# Patient Record
Sex: MALE | Race: BLACK OR AFRICAN AMERICAN | NOT HISPANIC OR LATINO | Employment: FULL TIME | URBAN - METROPOLITAN AREA
[De-identification: names, ages, dates, MRNs, and addresses within clinical notes are randomized per-mention and may not be internally consistent; named-entity substitution may affect disease eponyms.]

---

## 2024-05-31 ENCOUNTER — HOSPITAL ENCOUNTER (EMERGENCY)
Facility: HOSPITAL | Age: 24
Discharge: HOME | End: 2024-05-31
Payer: COMMERCIAL

## 2024-05-31 ENCOUNTER — APPOINTMENT (OUTPATIENT)
Dept: RADIOLOGY | Facility: HOSPITAL | Age: 24
End: 2024-05-31
Payer: COMMERCIAL

## 2024-05-31 VITALS
SYSTOLIC BLOOD PRESSURE: 105 MMHG | HEIGHT: 65 IN | DIASTOLIC BLOOD PRESSURE: 55 MMHG | BODY MASS INDEX: 22.82 KG/M2 | OXYGEN SATURATION: 98 % | HEART RATE: 62 BPM | WEIGHT: 137 LBS | RESPIRATION RATE: 18 BRPM | TEMPERATURE: 97.3 F

## 2024-05-31 DIAGNOSIS — S62.317A CLOSED DISPLACED FRACTURE OF BASE OF FIFTH METACARPAL BONE OF LEFT HAND, INITIAL ENCOUNTER: Primary | ICD-10-CM

## 2024-05-31 PROCEDURE — 29125 APPL SHORT ARM SPLINT STATIC: CPT | Mod: LT | Performed by: PHYSICIAN ASSISTANT

## 2024-05-31 PROCEDURE — 73090 X-RAY EXAM OF FOREARM: CPT | Mod: LEFT SIDE | Performed by: RADIOLOGY

## 2024-05-31 PROCEDURE — 73090 X-RAY EXAM OF FOREARM: CPT | Mod: LT

## 2024-05-31 PROCEDURE — 99284 EMERGENCY DEPT VISIT MOD MDM: CPT | Mod: 25

## 2024-05-31 PROCEDURE — 99284 EMERGENCY DEPT VISIT MOD MDM: CPT | Performed by: PHYSICIAN ASSISTANT

## 2024-05-31 PROCEDURE — 73110 X-RAY EXAM OF WRIST: CPT | Mod: LT

## 2024-05-31 PROCEDURE — 73130 X-RAY EXAM OF HAND: CPT | Mod: LT

## 2024-05-31 PROCEDURE — 29125 APPL SHORT ARM SPLINT STATIC: CPT | Performed by: PHYSICIAN ASSISTANT

## 2024-05-31 PROCEDURE — 73110 X-RAY EXAM OF WRIST: CPT | Mod: LEFT SIDE | Performed by: RADIOLOGY

## 2024-05-31 PROCEDURE — 73130 X-RAY EXAM OF HAND: CPT | Mod: LEFT SIDE | Performed by: RADIOLOGY

## 2024-05-31 PROCEDURE — 29125 APPL SHORT ARM SPLINT STATIC: CPT | Mod: LT

## 2024-05-31 PROCEDURE — 2500000001 HC RX 250 WO HCPCS SELF ADMINISTERED DRUGS (ALT 637 FOR MEDICARE OP): Performed by: PHYSICIAN ASSISTANT

## 2024-05-31 RX ORDER — ACETAMINOPHEN 325 MG/1
975 TABLET ORAL ONCE
Status: COMPLETED | OUTPATIENT
Start: 2024-05-31 | End: 2024-05-31

## 2024-05-31 RX ORDER — IBUPROFEN 600 MG/1
600 TABLET ORAL EVERY 6 HOURS PRN
Qty: 30 TABLET | Refills: 0 | Status: SHIPPED | OUTPATIENT
Start: 2024-05-31 | End: 2024-06-10

## 2024-05-31 RX ORDER — IBUPROFEN 600 MG/1
600 TABLET ORAL ONCE
Status: COMPLETED | OUTPATIENT
Start: 2024-05-31 | End: 2024-05-31

## 2024-05-31 RX ORDER — ACETAMINOPHEN 500 MG
500 TABLET ORAL EVERY 6 HOURS PRN
Qty: 30 TABLET | Refills: 0 | Status: SHIPPED | OUTPATIENT
Start: 2024-05-31 | End: 2024-06-10

## 2024-05-31 RX ADMIN — ACETAMINOPHEN 975 MG: 325 TABLET ORAL at 11:00

## 2024-05-31 RX ADMIN — IBUPROFEN 600 MG: 600 TABLET, FILM COATED ORAL at 11:00

## 2024-05-31 ASSESSMENT — LIFESTYLE VARIABLES
EVER FELT BAD OR GUILTY ABOUT YOUR DRINKING: NO
HAVE YOU EVER FELT YOU SHOULD CUT DOWN ON YOUR DRINKING: NO
EVER HAD A DRINK FIRST THING IN THE MORNING TO STEADY YOUR NERVES TO GET RID OF A HANGOVER: NO
TOTAL SCORE: 0
HAVE PEOPLE ANNOYED YOU BY CRITICIZING YOUR DRINKING: NO

## 2024-05-31 ASSESSMENT — PAIN - FUNCTIONAL ASSESSMENT: PAIN_FUNCTIONAL_ASSESSMENT: 0-10

## 2024-05-31 ASSESSMENT — PAIN SCALES - GENERAL: PAINLEVEL_OUTOF10: 7

## 2024-05-31 NOTE — ED TRIAGE NOTES
PT HAVING LEFT HAND PAIN AFTER HITTING A WALL. MEDIAL ASPECT OF HAND I SWOLLEN AND ROM LIMITED BY PAIN.

## 2024-05-31 NOTE — DISCHARGE INSTRUCTIONS
Appointment for June 6th at 9:30AM with hand surgeon, Dr. Hamilton. [facility] [address]. If you could make sure that this patient is able to have someone take him to his appointment in [place] that would be great!

## 2024-05-31 NOTE — ED PROVIDER NOTES
HPI   Chief Complaint   Patient presents with    Hand Injury       Patient is a 24-year-old left-hand-dominant male who presents today for left hand pain after he punched a wall, patient states he was angry because his baby mama was trying to take their kids.  He states he did not injure anything else, states he punched a wall out of frustration.  Did not hit anything else.  Patient has pain mostly along what he points to his ulnar side.  Denies numbness tingling.  He is able to extend all the fingers and make a fist.  No prior injury to this hand in the past.                            John Paul Coma Scale Score: 15                     Patient History   Past Medical History:   Diagnosis Date    Cannabinosis (Multi) 02/06/2020    Cannabinosis    Other specified health status 12/04/2014    No pertinent past medical history    Personal history of other mental and behavioral disorders 10/12/2020    History of depression     No past surgical history on file.  No family history on file.  Social History     Tobacco Use    Smoking status: Not on file    Smokeless tobacco: Not on file   Substance Use Topics    Alcohol use: Not on file    Drug use: Not on file       Physical Exam   ED Triage Vitals [05/31/24 1034]   Temperature Heart Rate Respirations BP   36.3 °C (97.3 °F) 62 18 105/55      Pulse Ox Temp src Heart Rate Source Patient Position   98 % -- -- --      BP Location FiO2 (%)     -- --       Physical Exam  Vitals and nursing note reviewed.   Constitutional:       General: He is not in acute distress.     Appearance: Normal appearance. He is not toxic-appearing.   HENT:      Head: Normocephalic and atraumatic.      Nose: Nose normal.   Eyes:      Extraocular Movements: Extraocular movements intact.   Cardiovascular:      Rate and Rhythm: Normal rate and regular rhythm.   Pulmonary:      Effort: Pulmonary effort is normal.   Abdominal:      Palpations: Abdomen is soft.   Musculoskeletal:         General: Normal range of  motion.        Hands:       Cervical back: Normal range of motion and neck supple.      Comments:   Tenderness palpation along fourth and fifth metacarpal bones, distal cap refill sensation to all 5 digits intact, full range of motion at MCP PIP and DIP to all digits, no rotation noted when patient makes a fist.  2+ radial pulse.  No snuffbox tenderness.  Mild forearm tenderness.     Skin:     General: Skin is warm and dry.   Neurological:      General: No focal deficit present.      Mental Status: He is alert.   Psychiatric:         Mood and Affect: Mood normal.         Thought Content: Thought content normal.       XR hand left 3+ views   Final Result   Metacarpal base fracture.        Signed by: Say Tamez 5/31/2024 12:48 PM   Dictation workstation:   [id number]      XR wrist left 3+ views   Final Result   Metacarpal base fracture.        Signed by: Say Tamez 5/31/2024 12:48 PM   Dictation workstation:   [id number]      XR forearm left 2 views   Final Result   Metacarpal base fracture.        Signed by: Say Tamez 5/31/2024 12:48 PM   Dictation workstation:   [id number]        Labs Reviewed - No data to display      ED Course & MDM   ED Course as of 05/31/24 1643   Fri May 31, 2024   1311 Ulnar gutter splint applied, neurovascularly intact after application [MK]      ED Course User Index  [MK] Za Alvares PA-C         Diagnoses as of 05/31/24 1643   Closed displaced fracture of base of fifth metacarpal bone of left hand, initial encounter       Medical Decision Making    MDM: Patient is a 24-year-old male who presents today for evaluation of a left hand injury after punching a wall, see above HPI and physical exam. X-ray of the left hand showed a metacarpal base fracture, I did speak with joanne Mcelroy who recommended ulnar gutter splint and follow-up with orthopedic surgery.  Patient has a scheduled appointment on the sixth at 930, ulnar gutter splint was applied by myself,  neurovascularly intact following application.  Patient stable for discharge at this time, return precautions to ED were discussed.  Will discharge with Tylenol and ibuprofen prescriptions.        Procedure  Splint Application    Performed by: Za Alvares PA-C  Authorized by: Za Alvares PA-C    Consent:     Consent obtained:  Verbal    Consent given by:  Patient    Risks, benefits, and alternatives were discussed: yes    Universal protocol:     Procedure explained and questions answered to patient or proxy's satisfaction: yes      Imaging studies available: yes      Patient identity confirmed:  Verbally with patient  Pre-procedure details:     Distal neurologic exam:  Normal    Distal perfusion: distal pulses strong and brisk capillary refill    Procedure details:     Location:  Hand    Hand location:  L hand    Strapping: no      Splint type:  Ulnar gutter    Supplies:  Cotton padding, elastic bandage and plaster    Attestation: Splint applied and adjusted personally by me    Post-procedure details:     Distal neurologic exam:  Normal    Distal perfusion: distal pulses strong, brisk capillary refill and unchanged      Procedure completion:  Tolerated well, no immediate complications    Post-procedure imaging: not applicable         Za Alvares PA-C  05/31/24 1649

## 2024-06-03 NOTE — PROGRESS NOTES
[facility]  Hand and Upper Extremity Service  Initial evaluation / Consultation         Consult requested by Referring Physician:    Chief Complaint: Left wrist metacarpal base fracture       24 y.o left hand dominant male who works as a  presenting for left wrist pain following an injury sustained on 5/31. He punched a wall and was seen in the ER with xrays revealing a displaced left 5th metacarpal base fracture. He was placed into a splint and presents now for follow up. He hasn't been able to return to work.            Please refer to New Patient Intake Form scanned into patient's electronic record for self reported past medical history, past surgical history, medications, allergies, family history, social history and 10 point review of systems    Examination:  Constitutional: Oriented to person, place, and time.  Appears well-developed and well-nourished.  Head: Normocephalic and atraumatic.  Eyes: Pupils are equal, round, and reactive to light.  Cardiovascular: Intact distal pulses.  Pulmonary/Chest/Breast: Effort normal. No respiratory distress.  Neurological: Alert and oriented to person, place, and time.  Skin: Skin is warm and dry.  Psychiatric: normal mood and affect.  Behavior is normal.  Musculoskeletal: Left wrist reveals ulnar sided swelling. Tenderness to palpation at the base of 5th metacarpal. Able to demonstrate near full range of motion but apprehensive to do so.        Personal Interpretation of Diagnostic studies: Xrays of left wrist dated 5/31 reveal a juxta-articular fracture of 5th metacarpal base with ulnar and proximal displacement of the distal segment by the ECU pull.       Impression: 5th metacarpal base fracture       Plan: We've discussed treatment options and given the displacement and potential of further displacement by pull of the extensor carpi ulnaris, he's agreed to proceed with closed reduction and percutaneous pinning of this fracture.  Today, we'll place him into a removable brace that he can remove for hygiene purposes. We'll get him on the operating room schedule for next week for this procedure. He'll likely then be in a cast for 5 weeks or so after surgery, allowing the fracture to heal before pin removal and progressive use of the hand and it may be 8-10 weeks before he's able to return to work as a .     Follow up: Next week for surgery        Patient was prescribed a contender ulnar gutter orthosis for fifth metacarpal fracture. The patient has weakness, instability and/or deformity of their left hand which requires stabilization from this orthosis to improve their function.      Verbal and written instructions for the use, wear schedule, cleaning and application of this item were given.  Patient was instructed that should the brace result in increased pain, decreased sensation, increased swelling, or an overall worsening of their medical condition, to please contact our office immediately.     Orthotic management and training was provided for skin care, modifications due to healing tissues, edema changes, interruption in skin integrity, and safety precautions with the orthosis.           Emil Hamilton MD  [facility]  Department of Orthopaedic Surgery  Hand and Upper Extremity Reconstruction      Scribe Attestation  By signing my name below, I, Tico Loaiza , Scribmervin   attest that this documentation has been prepared under the direction and in the presence of Dr. Emil Hamilton.      Dictation performed with the use of voice recognition software.  Syntax and grammatical errors may exist.

## 2024-06-03 NOTE — H&P (VIEW-ONLY)
[facility]  Hand and Upper Extremity Service  Initial evaluation / Consultation         Consult requested by Referring Physician:    Chief Complaint: Left wrist metacarpal base fracture       24 y.o left hand dominant male who works as a  presenting for left wrist pain following an injury sustained on 5/31. He punched a wall and was seen in the ER with xrays revealing a displaced left 5th metacarpal base fracture. He was placed into a splint and presents now for follow up. He hasn't been able to return to work.            Please refer to New Patient Intake Form scanned into patient's electronic record for self reported past medical history, past surgical history, medications, allergies, family history, social history and 10 point review of systems    Examination:  Constitutional: Oriented to person, place, and time.  Appears well-developed and well-nourished.  Head: Normocephalic and atraumatic.  Eyes: Pupils are equal, round, and reactive to light.  Cardiovascular: Intact distal pulses.  Pulmonary/Chest/Breast: Effort normal. No respiratory distress.  Neurological: Alert and oriented to person, place, and time.  Skin: Skin is warm and dry.  Psychiatric: normal mood and affect.  Behavior is normal.  Musculoskeletal: Left wrist reveals ulnar sided swelling. Tenderness to palpation at the base of 5th metacarpal. Able to demonstrate near full range of motion but apprehensive to do so.        Personal Interpretation of Diagnostic studies: Xrays of left wrist dated 5/31 reveal a juxta-articular fracture of 5th metacarpal base with ulnar and proximal displacement of the distal segment by the ECU pull.       Impression: 5th metacarpal base fracture       Plan: We've discussed treatment options and given the displacement and potential of further displacement by pull of the extensor carpi ulnaris, he's agreed to proceed with closed reduction and percutaneous pinning of this fracture.  Today, we'll place him into a removable brace that he can remove for hygiene purposes. We'll get him on the operating room schedule for next week for this procedure. He'll likely then be in a cast for 5 weeks or so after surgery, allowing the fracture to heal before pin removal and progressive use of the hand and it may be 8-10 weeks before he's able to return to work as a .     Follow up: Next week for surgery        Patient was prescribed a contender ulnar gutter orthosis for fifth metacarpal fracture. The patient has weakness, instability and/or deformity of their left hand which requires stabilization from this orthosis to improve their function.      Verbal and written instructions for the use, wear schedule, cleaning and application of this item were given.  Patient was instructed that should the brace result in increased pain, decreased sensation, increased swelling, or an overall worsening of their medical condition, to please contact our office immediately.     Orthotic management and training was provided for skin care, modifications due to healing tissues, edema changes, interruption in skin integrity, and safety precautions with the orthosis.           Emil Hamilton MD  [facility]  Department of Orthopaedic Surgery  Hand and Upper Extremity Reconstruction      Scribe Attestation  By signing my name below, I, Tico Loaiza , Scribmervin   attest that this documentation has been prepared under the direction and in the presence of Dr. Emil Hamilton.      Dictation performed with the use of voice recognition software.  Syntax and grammatical errors may exist.

## 2024-06-06 ENCOUNTER — OFFICE VISIT (OUTPATIENT)
Dept: ORTHOPEDIC SURGERY | Facility: CLINIC | Age: 24
End: 2024-06-06
Payer: COMMERCIAL

## 2024-06-06 VITALS — WEIGHT: 137 LBS | HEIGHT: 65 IN | BODY MASS INDEX: 22.82 KG/M2

## 2024-06-06 DIAGNOSIS — S62.317A CLOSED DISPLACED FRACTURE OF BASE OF FIFTH METACARPAL BONE OF LEFT HAND, INITIAL ENCOUNTER: ICD-10-CM

## 2024-06-06 DIAGNOSIS — S62.317A CLOSED DISPLACED FRACTURE OF BASE OF FIFTH METACARPAL BONE OF LEFT HAND, INITIAL ENCOUNTER: Primary | ICD-10-CM

## 2024-06-06 PROCEDURE — 99203 OFFICE O/P NEW LOW 30 MIN: CPT | Performed by: ORTHOPAEDIC SURGERY

## 2024-06-06 PROCEDURE — 99213 OFFICE O/P EST LOW 20 MIN: CPT | Performed by: ORTHOPAEDIC SURGERY

## 2024-06-06 ASSESSMENT — PAIN - FUNCTIONAL ASSESSMENT: PAIN_FUNCTIONAL_ASSESSMENT: 0-10

## 2024-06-06 ASSESSMENT — PAIN SCALES - GENERAL: PAINLEVEL_OUTOF10: 9

## 2024-06-06 ASSESSMENT — PAIN DESCRIPTION - DESCRIPTORS: DESCRIPTORS: ACHING;SORE

## 2024-06-10 ENCOUNTER — PRE-ADMISSION TESTING (OUTPATIENT)
Dept: PREADMISSION TESTING | Facility: HOSPITAL | Age: 24
End: 2024-06-10
Payer: COMMERCIAL

## 2024-06-10 ENCOUNTER — LAB (OUTPATIENT)
Dept: LAB | Facility: LAB | Age: 24
End: 2024-06-10
Payer: COMMERCIAL

## 2024-06-10 VITALS
WEIGHT: 124.78 LBS | OXYGEN SATURATION: 100 % | HEIGHT: 64 IN | BODY MASS INDEX: 21.3 KG/M2 | RESPIRATION RATE: 16 BRPM | HEART RATE: 69 BPM | DIASTOLIC BLOOD PRESSURE: 54 MMHG | TEMPERATURE: 97.2 F | SYSTOLIC BLOOD PRESSURE: 109 MMHG

## 2024-06-10 DIAGNOSIS — Z01.818 PREOP TESTING: ICD-10-CM

## 2024-06-10 DIAGNOSIS — Z01.818 PREOP TESTING: Primary | ICD-10-CM

## 2024-06-10 LAB
ANION GAP SERPL CALC-SCNC: 10 MMOL/L (ref 10–20)
BUN SERPL-MCNC: 15 MG/DL (ref 6–23)
CALCIUM SERPL-MCNC: 9.5 MG/DL (ref 8.6–10.3)
CHLORIDE SERPL-SCNC: 103 MMOL/L (ref 98–107)
CO2 SERPL-SCNC: 31 MMOL/L (ref 21–32)
CREAT SERPL-MCNC: 0.98 MG/DL (ref 0.5–1.3)
EGFRCR SERPLBLD CKD-EPI 2021: >90 ML/MIN/1.73M*2
ERYTHROCYTE [DISTWIDTH] IN BLOOD BY AUTOMATED COUNT: 14.2 % (ref 11.5–14.5)
GLUCOSE SERPL-MCNC: 80 MG/DL (ref 74–99)
HCT VFR BLD AUTO: 41.3 % (ref 41–52)
HGB BLD-MCNC: 13.2 G/DL (ref 13.5–17.5)
MCH RBC QN AUTO: 26.1 PG (ref 26–34)
MCHC RBC AUTO-ENTMCNC: 32 G/DL (ref 32–36)
MCV RBC AUTO: 82 FL (ref 80–100)
NRBC BLD-RTO: ABNORMAL /100{WBCS}
PLATELET # BLD AUTO: 146 X10*3/UL (ref 150–450)
POTASSIUM SERPL-SCNC: 3.8 MMOL/L (ref 3.5–5.3)
RBC # BLD AUTO: 5.05 X10*6/UL (ref 4.5–5.9)
SODIUM SERPL-SCNC: 140 MMOL/L (ref 136–145)
WBC # BLD AUTO: 4.4 X10*3/UL (ref 4.4–11.3)

## 2024-06-10 PROCEDURE — 80048 BASIC METABOLIC PNL TOTAL CA: CPT

## 2024-06-10 PROCEDURE — 85027 COMPLETE CBC AUTOMATED: CPT

## 2024-06-10 PROCEDURE — 36415 COLL VENOUS BLD VENIPUNCTURE: CPT

## 2024-06-10 ASSESSMENT — DUKE ACTIVITY SCORE INDEX (DASI)
CAN YOU PARTICIPATE IN MODERATE RECREATIONAL ACTIVITIES LIKE GOLF, BOWLING, DANCING, DOUBLES TENNIS OR THROWING A BASEBALL OR FOOTBALL: YES
DASI METS SCORE: 9.9
CAN YOU DO HEAVY WORK AROUND THE HOUSE LIKE SCRUBBING FLOORS OR LIFTING AND MOVING HEAVY FURNITURE: YES
CAN YOU RUN A SHORT DISTANCE: YES
CAN YOU TAKE CARE OF YOURSELF (EAT, DRESS, BATHE, OR USE TOILET): YES
CAN YOU DO LIGHT WORK AROUND THE HOUSE LIKE DUSTING OR WASHING DISHES: YES
CAN YOU WALK A BLOCK OR TWO ON LEVEL GROUND: YES
CAN YOU DO MODERATE WORK AROUND THE HOUSE LIKE VACUUMING, SWEEPING FLOORS OR CARRYING GROCERIES: YES
TOTAL_SCORE: 58.2
CAN YOU DO YARD WORK LIKE RAKING LEAVES, WEEDING OR PUSHING A MOWER: YES
CAN YOU HAVE SEXUAL RELATIONS: YES
CAN YOU CLIMB A FLIGHT OF STAIRS OR WALK UP A HILL: YES
CAN YOU WALK INDOORS, SUCH AS AROUND YOUR HOUSE: YES
CAN YOU PARTICIPATE IN STRENOUS SPORTS LIKE SWIMMING, SINGLES TENNIS, FOOTBALL, BASKETBALL, OR SKIING: YES

## 2024-06-10 ASSESSMENT — PAIN SCALES - GENERAL: PAINLEVEL_OUTOF10: 10 - WORST POSSIBLE PAIN

## 2024-06-10 ASSESSMENT — PAIN DESCRIPTION - DESCRIPTORS: DESCRIPTORS: ACHING

## 2024-06-10 ASSESSMENT — PAIN - FUNCTIONAL ASSESSMENT: PAIN_FUNCTIONAL_ASSESSMENT: 0-10

## 2024-06-10 NOTE — CPM/PAT H&P
CPM/PAT Evaluation       Name: Deborah Mcgee (Astoria D Everardo)  /Age:  y.o.     In-Person       Chief Complaint: Closed displaced fracture of base of fifth metacarpal bone of left hand, initial encounter     HPI  Patient is an alert and oriented 24 year old male scheduled for a closed reduction and percutaneous pinning left 5th metacarpal fracture on 2024 with Dr. Hamilton for closed displaced fracture of base of fifth metacarpal bone of left hand, initial encounter. Presents to [facility] PAT today for perioperative risk stratification and optimization.     Past Medical History:   Diagnosis Date    Cannabinosis (Multi) 2020    Cannabinosis    Other specified health status 2014    No pertinent past medical history    Personal history of other mental and behavioral disorders 10/12/2020    History of depression     No past surgical history on file.    No Known Allergies    Prior to Admission medications    Medication Sig Start Date End Date Taking? Authorizing Provider   acetaminophen (Tylenol) 500 mg tablet Take 1 tablet (500 mg) by mouth every 6 hours if needed for mild pain (1 - 3) for up to 10 days. 5/31/24 6/10/24  Za Alvares PA-C   ibuprofen 600 mg tablet Take 1 tablet (600 mg) by mouth every 6 hours if needed for mild pain (1 - 3) for up to 10 days. 5/31/24 6/10/24  Za Alvares PA-C       Review of Systems   Constitutional: Negative for chills, decreased appetite, diaphoresis, fever and malaise/fatigue.   Eyes:  Negative for blurred vision and double vision.   Cardiovascular:  Negative for chest pain, claudication, cyanosis, dyspnea on exertion, irregular heartbeat, leg swelling, near-syncope and palpitations.   Respiratory:  Negative for cough, hemoptysis, shortness of breath and wheezing.    Endocrine: Negative for cold intolerance, heat intolerance, polydipsia, polyphagia and polyuria.   Gastrointestinal:  Negative for abdominal pain, constipation, diarrhea, dysphagia, nausea  "and vomiting.   Genitourinary:  Negative for bladder incontinence, dysuria, hematuria, incomplete emptying, nocturia, pelvic pain and urgency.   Neurological:  Negative for headaches, light-headedness, paresthesias, sensory change and weakness.   Psychiatric/Behavioral:  Negative for altered mental status.       Vitals and nursing note reviewed.     Physical exam  Constitutional:       Appearance: Normal appearance. He is normal weight.   HENT:      Head: Normocephalic and atraumatic.      Mouth/Throat:      Mouth: Mucous membranes are moist.      Pharynx: Oropharynx is clear.   Eyes:      Extraocular Movements: Extraocular movements intact.      Conjunctiva/sclera: Conjunctivae normal.      Pupils: Pupils are equal, round, and reactive to light.   Cardiovascular:      Rate and Rhythm: Normal rate and regular rhythm.      Pulses: Normal pulses.      Heart sounds: Normal heart sounds.      No audible carotid bruit  Pulmonary:      Effort: Pulmonary effort is normal.      Breath sounds: Normal breath sounds.   Abdominal:      General: Abdomen is flat. Bowel sounds are normal.      Palpations: Abdomen is soft.   Musculoskeletal:      Cervical back: Normal range of motion and neck supple.   Skin:     General: Skin is warm and dry.      Capillary Refill: Capillary refill takes less than 2 seconds.   Neurological:      General: No focal deficit present.      Mental Status: He is alert and oriented to person, place, and time. Mental status is at baseline.   Psychiatric:         Mood and Affect: Mood normal.         Behavior: Behavior normal.         Thought Content: Thought content normal.         Judgment: Judgment normal.     Vitals and nursing note reviewed. Physical exam within normal limits.     Visit Vitals  /54   Pulse 69   Temp 36.2 °C (97.2 °F) (Temporal)   Resp 16   Ht 1.626 m (5' 4\")   Wt 56.6 kg (124 lb 12.5 oz)   SpO2 100%   BMI 21.42 kg/m²   Smoking Status Every Day   BSA 1.6 m²      DASI Risk Score  "     Flowsheet Row Most Recent Value   DASI SCORE 58.2   METS Score (Will be calculated only when all the questions are answered) 9.9          Caprini DVT Assessment      Flowsheet Row Most Recent Value   DVT Score 2   Current Status Minor surgery planned   Age Less than 40 years   BMI 30 or less          Modified Frailty Index      Flowsheet Row Most Recent Value   Modified Frailty Index Calculator 0          CHADS2 Stroke Risk         N/A 3 to 100%: High Risk   2 to < 3%: Medium Risk   0 to < 2%: Low Risk     Last Change: N/A          This score determines the patient's risk of having a stroke if the patient has atrial fibrillation.        This score is not applicable to this patient. Components are not calculated.          Revised Cardiac Risk Index      Flowsheet Row Most Recent Value   Revised Cardiac Risk Calculator 0          Apfel Simplified Score    No data to display       Risk Analysis Index Results This Encounter    No data found in the last 1 encounters.       Stop Bang Score      Flowsheet Row Most Recent Value   Do you snore loudly? 0   Do you often feel tired or fatigued after your sleep? 0   Has anyone ever observed you stop breathing in your sleep? 0   Do you have or are you being treated for high blood pressure? 0   Recent BMI (Calculated) 21.4   Is BMI greater than 35 kg/m2? 0=No   Age older than 50 years old? 0=No   Is your neck circumference greater than 17 inches (Male) or 16 inches (Female)? 0   Gender - Male 1=Yes   STOP-BANG Total Score 1          Assessment & Plan:    Neuro:  No diagnosis or significant findings on chart review or clinical presentation and evaluation.     HEENT/Airway:  No diagnosis or significant findings on chart review or clinical presentation and evaluation.   STOP-BANG Score-1 point low risk for FIFI    Mallampati::  II    TM distance::  >3 FB    Neck ROM::  Full  No dentures, caps, or dental implants    Cardiovascular:  No diagnosis or significant findings on chart  review or clinical presentation and evaluation.   METS: 9.9  RCRI: 0 points, 3.9%  risk for postoperative MACE   NAVNEET: 0.1% risk for postoperative MACE    Pulmonary:  No diagnosis or significant findings on chart review or clinical presentation and evaluation.   ARISCAT: <26 points, 1.6% risk of in-hospital postoperative pulmonary complication  PRODIGY: Moderate risk for opioid induced respiratory depression  Smoking History-Smokes cigars 1-2/day x 11 years  Discussed smoking cessation and deep breathing handout given    Renal:  No diagnosis or significant findings on chart review or clinical presentation and evaluation, however, the patient is at increased risk of perioperative renal complications secondary to male sex. Preventative measures include BP monitoring, medication compliance, and hydration management.   BMP reviewed    Endocrine:  No diagnosis or significant findings on chart review or clinical presentation and evaluation.     Hematologic:  Hgb 13.2  CBC reviewed  Caprini Score 2, patient at Low risk for postoperative DVT. Pt supplied education/VTE handout    Gastrointestinal:   No diagnosis or significant findings on chart review or clinical presentation and evaluation.   Alcohol use-Occasional  Drug use-denies    Infectious disease:   No diagnosis or significant findings on chart review or clinical presentation and evaluation.     Musculoskeletal:   No diagnosis or significant findings on chart review or clinical presentation and evaluation.   JHFRAT score-0 points low risk for falls    Anesthesia:  No anesthesia complications  No family history of anesthesia complications    Labs & Imaging ordered:  CBC, BMP  Nickel/metal allergy-denies  Shellfish allergy-denies    Overall, patient at low risk for the scheduled surgery. Discussed with patient medication instructions, NPO guidelines, and any questions or concerns.    Face to face time-30 minutes

## 2024-06-10 NOTE — PREPROCEDURE INSTRUCTIONS
Medication List            Accurate as of Smitha 10, 2024  2:58 PM. Always use your most recent med list.                acetaminophen 500 mg tablet  Commonly known as: Tylenol  Take 1 tablet (500 mg) by mouth every 6 hours if needed for mild pain (1 - 3) for up to 10 days.  Medication Adjustments for Surgery: Take morning of surgery with sip of water, no other fluids     ibuprofen 600 mg tablet  Take 1 tablet (600 mg) by mouth every 6 hours if needed for mild pain (1 - 3) for up to 10 days.  Medication Adjustments for Surgery: Stop 7 days before surgery  Notes to patient: Last dose preoperatively 6/4/2024            NPO Instructions:     Do not eat any food after midnight the night before your surgery/procedure.  You may have clear liquids until TWO hours before surgery/procedure. This includes water, black tea/coffee, (no milk or cream) apple juice and electrolyte drinks (Gatorade).  You may chew gum up to TWO hours before your surgery/procedure.     Additional Instructions:      Seven/Six Days before Surgery:  Review your medication instructions, stop indicated medications  Five Days before Surgery:  Review your medication instructions, stop indicated medications  Three Days before Surgery:  Review your medication instructions, stop indicated medications  The Day before Surgery:  Review your medication instructions, stop indicated medications  You will be contacted regarding the time of your arrival to facility and surgery time  Do not eat any food after Midnight  Day of Surgery:  Review your medication instructions, take indicated medications  If you have diabetes, please check your fasting blood sugar upon awakening.  If fasting blood sugar is <80 mg/dl, drink 100 ml of apple juice, time limit of 2 hours before  You may have clear liquids until TWO hours before surgery/procedure.  This includes water, black tea/coffee, (no milk or cream) apple juice and electrolyte drinks (Gatorade)  You may chew gum up to TWO  hours before your surgery/procedure  Wear  comfortable loose fitting clothing  Do not use moisturizers, creams, lotions or perfume  All jewelry and valuables should be left at home     CONTACT SURGEON'S OFFICE IF YOU DEVELOP:  * Fever = 100.4 F   * New respiratory symptoms (e.g. cough, shortness of breath, respiratory distress, sore throat)  * Recent loss of taste or smell  *Flu like symptoms such as headache, fatigue or gastrointestinal symptoms  * You develop any open sores, shingles, burning or painful urination   AND/OR:  * You no longer wish to have the surgery.  * Any other personal circumstances change that may lead to the need to cancel or defer this surgery.  *You were admitted to any hospital within one week of your planned procedure.     SMOKING:  *Quitting smoking can make a huge difference to your health and recovery from surgery.    *If you need help with quitting, call 9-752-QUIT-NOW.     THE DAY BEFORE SURGERY:  *Do not eat any food after midnight the night before your surgery.   *You may have up to TEN OUNCES of clear liquids until TWO hours before your instructed ARRIVAL TIME to hospital. This includes water, black tea/coffee, (no milk or cream) apple juice, clear broth and electrolyte drinks (Gatorade). Please avoid clear liquids that are red in color.   *You may chew gum/mints up to TWO hours before your surgery/procedure.     SURGICAL TIME:  *You will be contacted between 2 p.m. and 3 p.m. the business day before your surgery with your arrival time.  *If you haven't received a call by 3pm, call [phone]  *Scheduled surgery times may change and you will be notified if this occurs-check your personal voicemail for any updates.     ON THE MORNING OF SURGERY:  *Wear comfortable, loose fitting clothing.   *Do not use moisturizers, creams, lotions or perfume.  *All jewelry and valuables should be left at home.  *Prosthetic devices such as contact lenses, hearing aids, dentures, eyelash  extensions, hairpins and body piercing must be removed before surgery.     BRING WITH YOU:  *Photo ID and insurance card  *Current list of medications and allergies  *Pacemaker/Defibrillator/Heart stent cards  *CPAP machine and mask  *Slings/splints/crutches  *Copy of your complete Advanced Directive/DHPOA-if applicable  *Neurostimulator implant remote     PARKING AND ARRIVAL:  *Check in at the Main Entrance desk and let them know you are here for surgery.     IF YOU ARE HAVING OUTPATIENT/SAME DAY SURGERY:  *A responsible adult MUST accompany you at the time of discharge and stay with you for 24 hours after your surgery.  *You may NOT drive yourself home after surgery.  *You may use a taxi or ride sharing service ([employer], Uber) to return home ONLY if you are accompanied by a friend or family member.  *Instructions for resuming your medications will be provided by your surgeon.     Thank you for coming to Pre Admission testing.      If I have prescribe medication please don't forget to  at your pharmacy.      Any questions about today's visit call [phone] and leave a message in the general mailbox.     Patient instructed to ambulate as soon as possible postoperatively to decrease thromboembolic risk.     Tomasz Long, APRN-CNP     Thank you for visiting [facility].  If you have any changes to your health condition, please call the surgeons office to alert them and give them details of your symptoms.        Preoperative Fasting Guidelines     Why must I stop eating and drinking near surgery time?  With sedation, food or liquid in your stomach can enter your lungs causing serious complications  Increases nausea and vomiting     When do I need to stop eating and drinking before my surgery?  Do not eat any food after midnight the night before your surgery/procedure.  You may have up to TEN ounces of clear liquid until TWO hours before your instructed arrival time to the hospital.   This includes water, black tea/coffee, (no milk or cream) apple juice, and electrolyte drinks (Gatorade)  You may chew gum until TWO hours before your surgery/procedure        Additional Instructions:      The Day before Surgery:  -Review your medication instructions, stop indicated medications  -You will be contacted in the evening regarding the time of your arrival to facility and surgery time     Day of Surgery:  -Review your medication instructions, take indicated medications  -Wear comfortable loose fitting clothing  -Do not use moisturizers, creams, lotions or perfume  -All jewelry and valuables should be left at home                   Preoperative Brain Exercises     What are brain exercises?  A brain exercise is any activity that engages your thinking (cognitive) skills.     What types of activities are considered brain exercises?  Jigsaw puzzles, crossword puzzles, word jumble, memory games, word search, and many more.  Many can be found free online or on your phone via a mobile ashish.     Why should I do brain exercises before my surgery?  More recent research has shown brain exercise before surgery can lower the risk of postoperative delirium (confusion) which can be especially important for older adults.  Patients who did brain exercises for 5 to 10 hours the days before surgery, cut their risk of postoperative delirium in half up to 1 week after surgery.                         [facility]     Preoperative Deep Breathing Exercises     Why it is important to do deep breathing exercises before my surgery?  Deep breathing exercises strengthen your breathing muscles.  This helps you to recover after your surgery and decreases the chance of breathing complications.        How are the deep breathing exercises done?  Sit straight with your back supported.  Breathe in deeply and slowly through your nose. Your lower rib cage should expand and your abdomen may move forward.  Hold that  breath for 3 to 5 seconds.  Breathe out through pursed lips, slowly and completely.  Rest and repeat 10 times every hour while awake.  Rest longer if you become dizzy or lightheaded.                      [facility]     Preoperative Deep Breathing Exercises     Why it is important to do deep breathing exercises before my surgery?  Deep breathing exercises strengthen your breathing muscles.  This helps you to recover after your surgery and decreases the chance of breathing complications.        How are the deep breathing exercises done?  Sit straight with your back supported.  Breathe in deeply and slowly through your nose. Your lower rib cage should expand and your abdomen may move forward.  Hold that breath for 3 to 5 seconds.  Breathe out through pursed lips, slowly and completely.  Rest and repeat 10 times every hour while awake.  Rest longer if you become dizzy or lightheaded.        Patient Information: Incentive Spirometer  What is an incentive spirometer?  An incentive spirometer is a device used before and after surgery to “exercise” your lungs.  It helps you to take deeper breaths to expand your lungs.  Below is an example of a basic incentive spirometer.  The device you receive may differ slightly but they all function the same.    Why do I need to use an incentive spirometer?  Using your incentive spirometer prepares your lungs for surgery and helps prevent lung problems after surgery.  How do I use my incentive spirometer?  When you're using your incentive spirometer, make sure to breathe through your mouth. If you breathe through your nose, the incentive spirometer won't work properly. You can hold your nose if you have trouble.  If you feel dizzy at any time, stop and rest. Try again at a later time.  Follow the steps below:  Set up your incentive spirometer, expand the flexible tubing and connect to the outlet.  Sit upright in a chair or bed. Hold the incentive spirometer at  eye level.   Put the mouthpiece in your mouth and close your lips tightly around it. Slowly breathe out (exhale) completely.  Breathe in (inhale) slowly through your mouth as deeply as you can. As you take a breath, you will see the piston rise inside the large column. While the piston rises, the indicator should move upwards. It should stay in between the 2 arrows (see Figure).  Try to get the piston as high as you can, while keeping the indicator between the arrows.   If the indicator doesn't stay between the arrows, you're breathing either too fast or too slow.  When you get it as high as you can, hold your breath for 10 seconds, or as long as possible. While you're holding your breath, the piston will slowly fall to the base of the spirometer.  Once the piston reaches the bottom of the spirometer, breathe out slowly through your mouth. Rest for a few seconds.  Repeat 10 times. Try to get the piston to the same level with each breath.  Repeat every hour while awake  You can carefully clean the outside of the mouthpiece with an alcohol wipe or soap and water.       Patient and Family Education             Ways You Can Help Prevent Blood Clots                    This handout explains some simple things you can do to help prevent blood clots.      Blood clots are blockages that can form in the body's veins. When a blood clot forms in your deep veins, it may be called a deep vein thrombosis, or DVT for short. Blood clots can happen in any part of the body where blood flows, but they are most common in the arms and legs. If a piece of a blood clot breaks free and travels to the lungs, it is called a pulmonary embolus (PE). A PE can be a very serious problem.         Being in the hospital or having surgery can raise your chances of getting a blood clot because you may not be well enough to move around as much as you normally do.         Ways you can help prevent blood clots in the hospital           Wearing SCDs. SCDs  stands for Sequential Compression Devices.   SCDs are special sleeves that wrap around your legs  They attach to a pump that fills them with air to gently squeeze your legs every few minutes.   This helps return the blood in your legs to your heart.   SCDs should only be taken off when walking or bathing.   SCDs may not be comfortable, but they can help save your life.                                            Wearing compression stockings - if your doctor orders them. These special snug fitting stockings gently squeeze your legs to help blood flow.       Walking. Walking helps move the blood in your legs.   If your doctor says it is ok, try walking the halls at least   5 times a day. Ask us to help you get up, so you don't fall.      Taking any blood thinning medicines your doctor orders.        Page 1 of 2            [facility]; 3/23   Ways you can help prevent blood clots at home         Wearing compression stockings - if your doctor orders them. ? Walking - to help move the blood in your legs.       Taking any blood thinning medicines your doctor orders.      Signs of a blood clot or PE        Tell your doctor or nurse know right away if you have of the problems listed below.    If you are at home, seek medical care right away. Call 911 for chest pain or problems breathing.                Signs of a blood clot (DVT) - such as pain,  swelling, redness or warmth in your arm or leg      Signs of a pulmonary embolism (PE) - such as chest     pain or feeling short of breath

## 2024-06-10 NOTE — PERIOPERATIVE NURSING NOTE
Called patient at [phone] as he has not yet arrived for his PAT visit which was rescheduled after he didn't answer/ was not available for the phone screening call this morning. Pt reports he just pulled in the parking lot and will be up to the second floor in a few minutes to check in.

## 2024-06-12 ENCOUNTER — ANESTHESIA EVENT (OUTPATIENT)
Dept: OPERATING ROOM | Facility: HOSPITAL | Age: 24
End: 2024-06-12
Payer: COMMERCIAL

## 2024-06-12 ENCOUNTER — APPOINTMENT (OUTPATIENT)
Dept: RADIOLOGY | Facility: HOSPITAL | Age: 24
End: 2024-06-12
Payer: COMMERCIAL

## 2024-06-12 ENCOUNTER — HOSPITAL ENCOUNTER (OUTPATIENT)
Facility: HOSPITAL | Age: 24
Setting detail: OUTPATIENT SURGERY
Discharge: HOME | End: 2024-06-12
Attending: ORTHOPAEDIC SURGERY | Admitting: ORTHOPAEDIC SURGERY
Payer: COMMERCIAL

## 2024-06-12 ENCOUNTER — ANESTHESIA (OUTPATIENT)
Dept: OPERATING ROOM | Facility: HOSPITAL | Age: 24
End: 2024-06-12
Payer: COMMERCIAL

## 2024-06-12 VITALS
OXYGEN SATURATION: 97 % | RESPIRATION RATE: 16 BRPM | TEMPERATURE: 98.1 F | BODY MASS INDEX: 21.16 KG/M2 | DIASTOLIC BLOOD PRESSURE: 62 MMHG | HEIGHT: 65 IN | SYSTOLIC BLOOD PRESSURE: 110 MMHG | HEART RATE: 62 BPM | WEIGHT: 126.98 LBS

## 2024-06-12 DIAGNOSIS — S62.317A CLOSED DISPLACED FRACTURE OF BASE OF FIFTH METACARPAL BONE OF LEFT HAND, INITIAL ENCOUNTER: Primary | ICD-10-CM

## 2024-06-12 PROCEDURE — 2500000004 HC RX 250 GENERAL PHARMACY W/ HCPCS (ALT 636 FOR OP/ED): Performed by: ANESTHESIOLOGY

## 2024-06-12 PROCEDURE — 2500000004 HC RX 250 GENERAL PHARMACY W/ HCPCS (ALT 636 FOR OP/ED): Performed by: ORTHOPAEDIC SURGERY

## 2024-06-12 PROCEDURE — 7100000002 HC RECOVERY ROOM TIME - EACH INCREMENTAL 1 MINUTE: Performed by: ORTHOPAEDIC SURGERY

## 2024-06-12 PROCEDURE — 2500000004 HC RX 250 GENERAL PHARMACY W/ HCPCS (ALT 636 FOR OP/ED): Mod: JZ | Performed by: ORTHOPAEDIC SURGERY

## 2024-06-12 PROCEDURE — 7100000009 HC PHASE TWO TIME - INITIAL BASE CHARGE: Performed by: ORTHOPAEDIC SURGERY

## 2024-06-12 PROCEDURE — 26608 TREAT METACARPAL FRACTURE: CPT | Performed by: ORTHOPAEDIC SURGERY

## 2024-06-12 PROCEDURE — 3700000001 HC GENERAL ANESTHESIA TIME - INITIAL BASE CHARGE: Performed by: ORTHOPAEDIC SURGERY

## 2024-06-12 PROCEDURE — 7100000010 HC PHASE TWO TIME - EACH INCREMENTAL 1 MINUTE: Performed by: ORTHOPAEDIC SURGERY

## 2024-06-12 PROCEDURE — 2500000005 HC RX 250 GENERAL PHARMACY W/O HCPCS: Performed by: ORTHOPAEDIC SURGERY

## 2024-06-12 PROCEDURE — 3600000008 HC OR TIME - EACH INCREMENTAL 1 MINUTE - PROCEDURE LEVEL THREE: Performed by: ORTHOPAEDIC SURGERY

## 2024-06-12 PROCEDURE — 7100000001 HC RECOVERY ROOM TIME - INITIAL BASE CHARGE: Performed by: ORTHOPAEDIC SURGERY

## 2024-06-12 PROCEDURE — 3700000002 HC GENERAL ANESTHESIA TIME - EACH INCREMENTAL 1 MINUTE: Performed by: ORTHOPAEDIC SURGERY

## 2024-06-12 PROCEDURE — 3600000003 HC OR TIME - INITIAL BASE CHARGE - PROCEDURE LEVEL THREE: Performed by: ORTHOPAEDIC SURGERY

## 2024-06-12 RX ORDER — FENTANYL CITRATE 50 UG/ML
INJECTION, SOLUTION INTRAMUSCULAR; INTRAVENOUS AS NEEDED
Status: DISCONTINUED | OUTPATIENT
Start: 2024-06-12 | End: 2024-06-12

## 2024-06-12 RX ORDER — SODIUM CHLORIDE, SODIUM LACTATE, POTASSIUM CHLORIDE, CALCIUM CHLORIDE 600; 310; 30; 20 MG/100ML; MG/100ML; MG/100ML; MG/100ML
50 INJECTION, SOLUTION INTRAVENOUS CONTINUOUS
Status: DISCONTINUED | OUTPATIENT
Start: 2024-06-12 | End: 2024-06-12 | Stop reason: HOSPADM

## 2024-06-12 RX ORDER — OXYCODONE HYDROCHLORIDE 5 MG/1
5 TABLET ORAL EVERY 6 HOURS PRN
Qty: 28 TABLET | Refills: 0 | Status: SHIPPED | OUTPATIENT
Start: 2024-06-12 | End: 2024-06-19

## 2024-06-12 RX ORDER — MEPERIDINE HYDROCHLORIDE 25 MG/ML
12.5 INJECTION INTRAMUSCULAR; INTRAVENOUS; SUBCUTANEOUS ONCE
Status: COMPLETED | OUTPATIENT
Start: 2024-06-12 | End: 2024-06-12

## 2024-06-12 RX ORDER — FENTANYL CITRATE 50 UG/ML
25 INJECTION, SOLUTION INTRAMUSCULAR; INTRAVENOUS EVERY 5 MIN PRN
Status: DISCONTINUED | OUTPATIENT
Start: 2024-06-12 | End: 2024-06-12 | Stop reason: HOSPADM

## 2024-06-12 RX ORDER — ONDANSETRON HYDROCHLORIDE 2 MG/ML
INJECTION, SOLUTION INTRAVENOUS AS NEEDED
Status: DISCONTINUED | OUTPATIENT
Start: 2024-06-12 | End: 2024-06-12

## 2024-06-12 RX ORDER — KETOROLAC TROMETHAMINE 30 MG/ML
INJECTION, SOLUTION INTRAMUSCULAR; INTRAVENOUS AS NEEDED
Status: DISCONTINUED | OUTPATIENT
Start: 2024-06-12 | End: 2024-06-12

## 2024-06-12 RX ORDER — FENTANYL CITRATE 50 UG/ML
50 INJECTION, SOLUTION INTRAMUSCULAR; INTRAVENOUS EVERY 5 MIN PRN
Status: DISCONTINUED | OUTPATIENT
Start: 2024-06-12 | End: 2024-06-12 | Stop reason: HOSPADM

## 2024-06-12 RX ORDER — MIDAZOLAM HYDROCHLORIDE 1 MG/ML
INJECTION, SOLUTION INTRAMUSCULAR; INTRAVENOUS AS NEEDED
Status: DISCONTINUED | OUTPATIENT
Start: 2024-06-12 | End: 2024-06-12

## 2024-06-12 RX ORDER — SODIUM CHLORIDE, SODIUM LACTATE, POTASSIUM CHLORIDE, CALCIUM CHLORIDE 600; 310; 30; 20 MG/100ML; MG/100ML; MG/100ML; MG/100ML
100 INJECTION, SOLUTION INTRAVENOUS CONTINUOUS
Status: DISCONTINUED | OUTPATIENT
Start: 2024-06-12 | End: 2024-06-12 | Stop reason: HOSPADM

## 2024-06-12 RX ORDER — CEFAZOLIN SODIUM 2 G/100ML
2 INJECTION, SOLUTION INTRAVENOUS ONCE
Status: COMPLETED | OUTPATIENT
Start: 2024-06-12 | End: 2024-06-12

## 2024-06-12 RX ORDER — BUPIVACAINE HYDROCHLORIDE 5 MG/ML
INJECTION, SOLUTION PERINEURAL AS NEEDED
Status: DISCONTINUED | OUTPATIENT
Start: 2024-06-12 | End: 2024-06-12 | Stop reason: HOSPADM

## 2024-06-12 RX ORDER — PROPOFOL 10 MG/ML
INJECTION, EMULSION INTRAVENOUS AS NEEDED
Status: DISCONTINUED | OUTPATIENT
Start: 2024-06-12 | End: 2024-06-12

## 2024-06-12 RX ORDER — OXYCODONE HYDROCHLORIDE 5 MG/1
5 TABLET ORAL ONCE
Status: DISCONTINUED | OUTPATIENT
Start: 2024-06-12 | End: 2024-06-12 | Stop reason: HOSPADM

## 2024-06-12 RX ORDER — LIDOCAINE HYDROCHLORIDE 10 MG/ML
INJECTION INFILTRATION; PERINEURAL AS NEEDED
Status: DISCONTINUED | OUTPATIENT
Start: 2024-06-12 | End: 2024-06-12 | Stop reason: HOSPADM

## 2024-06-12 SDOH — HEALTH STABILITY: MENTAL HEALTH: CURRENT SMOKER: 1

## 2024-06-12 ASSESSMENT — PAIN DESCRIPTION - ORIENTATION: ORIENTATION: LEFT

## 2024-06-12 ASSESSMENT — PAIN SCALES - PAIN ASSESSMENT IN ADVANCED DEMENTIA (PAINAD)
TOTALSCORE: COLD PACK
BODYLANGUAGE: RELAXED
CONSOLABILITY: NO NEED TO CONSOLE
TOTALSCORE: 2
TOTALSCORE: 0
BODYLANGUAGE: RELAXED
TOTALSCORE: MEDICATION (SEE MAR);COLD PACK
NEGVOCALIZATION: OCCASIONAL MOAN/GROAN, LOW SPEECH, NEGATIVE/DISAPPROVING QUALITY
BREATHING: NORMAL
FACIALEXPRESSION: SAD, FRIGHTENED, FROWN
BREATHING: NORMAL
TOTALSCORE: 0
BREATHING: NORMAL
BODYLANGUAGE: RELAXED
CONSOLABILITY: NO NEED TO CONSOLE
CONSOLABILITY: NO NEED TO CONSOLE
FACIALEXPRESSION: SMILING OR INEXPRESSIVE
FACIALEXPRESSION: SMILING OR INEXPRESSIVE

## 2024-06-12 ASSESSMENT — PAIN SCALES - GENERAL
PAINLEVEL_OUTOF10: 0 - NO PAIN
PAINLEVEL_OUTOF10: 7
PAINLEVEL_OUTOF10: 0 - NO PAIN
PAINLEVEL_OUTOF10: 3
PAIN_LEVEL: 0
PAINLEVEL_OUTOF10: 0 - NO PAIN
PAINLEVEL_OUTOF10: 0 - NO PAIN
PAINLEVEL_OUTOF10: 3
PAINLEVEL_OUTOF10: 0 - NO PAIN
PAINLEVEL_OUTOF10: 0 - NO PAIN

## 2024-06-12 ASSESSMENT — PAIN DESCRIPTION - LOCATION: LOCATION: HAND

## 2024-06-12 ASSESSMENT — PAIN DESCRIPTION - DESCRIPTORS: DESCRIPTORS: ACHING

## 2024-06-12 ASSESSMENT — PAIN SCALES - WONG BAKER: WONGBAKER_NUMERICALRESPONSE: HURTS EVEN MORE

## 2024-06-12 NOTE — PERIOPERATIVE NURSING NOTE
Sleeping. Awakens to verbal told surgery over and in pacu patient nods head. Refuses to open eyes. Nick hugger for shivering.

## 2024-06-12 NOTE — OP NOTE
Closed reduction and percutaneous pinning left 5th metacarpal fracture / 30 minutes ( k wires - mini c arm ) (L) Operative Note     Date: 2024  OR Location: [facility]    Name: Deborah Mcgee, : 2000, Age: 24 y.o., MRN: [id number], Sex: male    Diagnosis  Pre-op Diagnosis     * Closed displaced fracture of base of fifth metacarpal bone of left hand, initial encounter [S62.317A] Post-op Diagnosis     * Closed displaced fracture of base of fifth metacarpal bone of left hand, initial encounter [S6317A]     Procedures  Closed reduction and percutaneous pinning left 5th metacarpal fracture / 30 minutes ( k wires - mini c arm )  95720 - TN PRQ SKELETAL FIXJ METACARPAL FX EACH BONE      Surgeons      * Emil Hamilton - Primary    Resident/Fellow/Other Assistant:  Surgeons and Role:  * No surgeons found with a matching role *    Procedure Summary  Anesthesia: Monitor Anesthesia Care  ASA: II  Anesthesia Staff: Anesthesiologist: Haley Rain MD MPH  Estimated Blood Loss: 0 mL  Intra-op Medications:   Administrations occurring from 0700 to 0805 on 24:   Medication Name Total Dose   BUPivacaine HCl (Marcaine) 0.5 % (5 mg/mL) injection 5 mL   lidocaine (Xylocaine) 10 mg/mL (1 %) injection 5 mL   ceFAZolin in dextrose (iso-os) (Ancef) IVPB 2 g 2 g              Anesthesia Record               Intraprocedure I/O Totals       None           Specimen: No specimens collected     Staff:   Circulator: Mari  Scrub Person: Riley  Scrub Person: Stephanie         Drains and/or Catheters: * None in log *    Tourniquet Times:         Implants:     Findings: Left fifth metacarpal base fracture    Indications: Deborah Mcgee is an 24 y.o. male who is having surgery for Closed displaced fracture of base of fifth metacarpal bone of left hand, initial encounter [H62.465A].      The patient was seen in the preoperative area. The risks, benefits, complications, treatment options, non-operative alternatives, expected recovery and  outcomes were discussed with the patient. The possibilities of reaction to medication, pulmonary aspiration, injury to surrounding structures, bleeding, recurrent infection, the need for additional procedures, failure to diagnose a condition, and creating a complication requiring transfusion or operation were discussed with the patient. The patient concurred with the proposed plan, giving informed consent.  The site of surgery was properly noted/marked if necessary per policy. The patient has been actively warmed in preoperative area. Preoperative antibiotics have been ordered and given within 1 hours of incision. Venous thrombosis prophylaxis have been ordered including bilateral sequential compression devices    Procedure Details:   24-year-old gentleman left-hand-dominant presents today for closed duction percutaneous pinning of an extra-articular fracture at the base of the left fifth metacarpal.  Preoperatively left hand was identified and marked for surgery.  Informed consent process was completed.    Patient was brought to the operating room placed supine on the operating table.  Timeout procedure was performed to verify the correct patient procedure and operative site.  Intravenous antibiotics were administered.  After appropriate level of IV sedation achieved local anesthetic was infiltrated around the base of the left hand.  Left upper extremity was then prepped and draped in usual sterile fashion.      Using fluoroscopic guidance we identified the fracture and confirm that we were able to reduce this primarily with longitudinal traction and then some gentle force applied along the ulnar border of the fifth metacarpal to translated radially.  We then placed a 0.054 inch Zoey wire across the fracture into the distal carpal row.  A second 0.054 inch Zoey wire was placed from the ulnar base of the fifth metacarpal through the fourth metacarpal and into the third metacarpal.  Fluoroscopic images  confirmed near anatomic fracture alignment and appropriate implant position.  The pins were cut outside the level of the skin and protected with Anuel balls.  Sterile bandages were applied.  The patient was placed into a well-padded ulnar gutter splint holding the wrist into slight extension of the MP joints into flexion of the ring and small finger.  He was awoken from his anesthetic and transferred to recovery in stable condition.    Postoperatively he will be discharged home once comfortable.  He will remain in his splint until he returns to clinic in 2 weeks at which point we will remove the splint to inspect the pin sites and then put him back into a ulnar gutter splint or cast for an additional 2 to 3 weeks before obtaining x-rays and considering pin removal.        Complications:  None; patient tolerated the procedure well.    Disposition: PACU - hemodynamically stable.  Condition: stable         Additional Details:      Attending Attestation: I was present and scrubbed for the entire procedure.    Emil Hamilton  Phone Number: [phone]

## 2024-06-12 NOTE — DISCHARGE INSTRUCTIONS
Post-Operative Instructions  Dr. Emil Hamilton [phone]    Dressing:  You have a bandage or splint covering your operative site.    Do not remove the dressing until your next scheduled appointment with your surgeon or therapist. Keep your dressing clean and dry. The dressing will be removed at that appointment.     Showering:  You may shower following surgery but please adhere to above instructions regarding the dressing. If showering with bandage/splint in place please ensure that it is kept dry and covered while bathing. There are commercially available cast bags that can be used to protect your dressing while showering. If using garbage bags please make sure that there are no holes in the bag and that the bag has been sealed above the dressing. If the bandage gets wet you must contact your surgeon's office to make arrangements to be seen to have the bandage changed.     Ice/Elevation:  The application of ice to your surgical site after surgery will help with pain control and swelling. This can be very effective for 48-72 hours after surgery. Please be careful to avoid getting bandage wet from a leaky ice bag. Please be advised that the ice may need to be applied for longer periods of time for the cooling effect to penetrate the post-operative dressing.     Elevation of the operative site above the level of the heart as much as possible for the first 48-72 hours after surgery. Use your sling if you have been provided one while standing or walking. If your fingers are not included in the dressing you are encouraged to attempt finger range of motion as this will help with your hand swelling and ultimate recovery.    Pain Medication:  If you received a regional anesthetic on the day of your surgery your arm and hand may be numb for up to 24 hours after surgery. It is important to wear your sling while the block is still effective in order to protect your arm. It is advisable to take pain medications prior to  going to sleep in case the regional anesthesia medication wears off while you are sleeping.    Take your pain medications as directed. Narcotic pain medications can cause lethargy, nausea and constipation. Please contact your surgeon's office and discontinue the medication if these symptoms become problematic. Eating a regular diet, drinking fluids and walking can help with constipation from these medications. Avoid alcohol consumption and driving while taking narcotic pain medications.     Additional pain control options:     You are encouraged to take over the counter medications like Advil / Motrin / Ibuprofen / Aleve in addition to your prescribed pain medications after surgery.    Smoking/Tobacco:  Tobacco use is known to interfere with wound and fracture healing and increase post-operative pain. It can also increase your risk of poor outcomes following surgery. Please do not use tobacco or nicotine products following your surgery. This includes smokeless tobacco, or nicotine replacement products (patches, gum, etc.).    Driving:  It is not advisable to operate a vehicle while using narcotic pain medications. Additionally, please be advised that you are likely to have challenges operating a car or motorcycle while you are still in your postoperative dressing and this could increase your risk of being involved in an accident and being cited for driving while physically impaired.     Warning Signs:  Observe your arm/hand and incision site (if visible) for increased redness, inflammation, drainage, odor or pain that is unrelieved by rest, elevation or medication. Please contact your surgeon's office immediately if you develop any of these issues or if you develop a fever greater than 101°.    Follow Up Appointments:  Your post-operative appointment has been scheduled for 6/25/24 11:30 am    [facility], [address], [place], [place], [address]

## 2024-06-12 NOTE — PERIOPERATIVE NURSING NOTE
0734 Arrives to pacu woth Dr Rain sfm 10 lungs clear left arm in sling dressing dry and intact ice bag under fingers. Patient responds to physical stimuli reaching for mask pulling it off saturation within normal limits. Lungs clear sinus rhythm. Circulation checks within normal limits.

## 2024-06-12 NOTE — ANESTHESIA PREPROCEDURE EVALUATION
Patient: Deborah Mcgee    Procedure Information       Date/Time: 06/12/24 0700    Procedure: Closed reduction and percutaneous pinning left 5th metacarpal fracture / 30 minutes ( k wires - mini c arm ) (Left: Little Finger)    Location: [facility] / [facility]    Surgeons: Emil Hamilton MD            Relevant Problems   Anesthesia (within normal limits)      Cardiac (within normal limits)      Pulmonary  Smoker - tob and daily MJ      Neuro  Mental health issues      GI (within normal limits)      /Renal (within normal limits)      Liver (within normal limits)      Endocrine (within normal limits)      Hematology (within normal limits)      Musculoskeletal (within normal limits)      HEENT (within normal limits)      ID (within normal limits)      Skin (within normal limits)      GYN (within normal limits)       Clinical information reviewed:   Tobacco  Allergies  Meds   Med Hx  Surg Hx   Fam Hx  Soc Hx        NPO Detail:  NPO/Void Status  Date of Last Liquid: 06/11/24  Time of Last Liquid: 2300  Date of Last Solid: 06/11/24  Time of Last Solid: 1100  Time of Last Void: 0400         Physical Exam    Airway  Mallampati: I  TM distance: >3 FB  Neck ROM: full     Cardiovascular - normal exam     Dental    Pulmonary - normal exam     Abdominal - normal exam         Anesthesia Plan    History of general anesthesia?: no  History of complications of general anesthesia?: no    ASA 2     MAC     The patient is a current smoker.  Patient was previously instructed to abstain from smoking on day of procedure.  Patient did not smoke on day of procedure.  Education provided regarding risk of obstructive sleep apnea.  intravenous induction   Anesthetic plan and risks discussed with patient.

## 2024-06-12 NOTE — ANESTHESIA POSTPROCEDURE EVALUATION
Patient: Deborah Mcgee    Procedure Summary       Date: 06/12/24 Room / Location: [facility] / [facility]    Anesthesia Start: 0659 Anesthesia Stop: 0739    Procedure: Closed reduction and percutaneous pinning left 5th metacarpal fracture / 30 minutes ( k wires - mini c arm ) (Left: Little Finger) Diagnosis:       Closed displaced fracture of base of fifth metacarpal bone of left hand, initial encounter      (Closed displaced fracture of base of fifth metacarpal bone of left hand, initial encounter [S62.317A])    Surgeons: Emil Hamilton MD Responsible Provider: Haley Rain MD [facility]    Anesthesia Type: MAC ASA Status: 2            Anesthesia Type: MAC    Vitals Value Taken Time   /60 06/12/24 0755   Temp 36.4 °C (97.5 °F) 06/12/24 0745   Pulse 70 06/12/24 0758   Resp 16 06/12/24 0758   SpO2 99 % 06/12/24 0758   Vitals shown include unfiled device data.    Anesthesia Post Evaluation    Patient location during evaluation: PACU  Patient participation: complete - patient participated  Level of consciousness: awake and alert  Pain score: 0  Pain management: adequate  Multimodal analgesia pain management approach  Airway patency: patent  Two or more strategies used to mitigate risk of obstructive sleep apnea  Cardiovascular status: acceptable  Respiratory status: acceptable  Hydration status: acceptable  Postoperative Nausea and Vomiting: none    No notable events documented.

## 2024-06-12 NOTE — PERIOPERATIVE NURSING NOTE
0816 Patient half asleep head of bed up to facilitate waking up given snack and beverage. Offers no complaints of pain SDS updated on patient progress. Patient holding arm moaning states pain 7/10 shivering profusely once leonides hugger removed. Dr Rain called for oral med. For pain and Demerol order for shacking. Patient placed on Leonides hugger head of bed back down sleeping with light snore. But occasionally shaking. Demerol  IV times 1 for shivering. Patient awoken from sleep states pain is 7/10 falls back to sleep without intervention.

## 2024-06-24 NOTE — PROGRESS NOTES
[facility]  Hand and Upper Extremity Service  Post Operative visit         Date of surgery: 6/12/24    Surgery(s) performed: Left closed reduction and percutaneous pinning 5th metacarpal fracture        Subjective report: First postoperative visit.  Pain well-controlled       Exam findings: Pin sites clean and dry.  Mild digital swelling.  Excellent digital range of motion       Radiograph findings: No images obtained       Impression: Left fifth metacarpal base fracture      Plan: Today he was placed back into a short arm fiberglass cast.  Cast care instructions reviewed.  He will return in 3 weeks for repeat clinical examination with x-rays left hand out of cast.  Anticipate pin removal at that visit and progression of activities.       Medications Prescribed: None       Follow Up: 3 weeks with x-ray left hand out of cast            Emil Hamilton MD    [facility] [employer]  Department of Orthopaedic Surgery  Chief of Hand and Upper Extremity Surgery  [facility]    Scribe Attestation  By signing my name below, ITico , Alba   attest that this documentation has been prepared under the direction and in the presence of Dr. Emil Hamilton.      Dictation performed with the use of voice recognition software. Syntax and grammatical errors may exist.

## 2024-06-25 ENCOUNTER — OFFICE VISIT (OUTPATIENT)
Dept: ORTHOPEDIC SURGERY | Facility: HOSPITAL | Age: 24
End: 2024-06-25
Payer: COMMERCIAL

## 2024-06-25 VITALS — BODY MASS INDEX: 20.99 KG/M2 | HEIGHT: 65 IN | WEIGHT: 126 LBS

## 2024-06-25 DIAGNOSIS — S62.317A CLOSED DISPLACED FRACTURE OF BASE OF FIFTH METACARPAL BONE OF LEFT HAND, INITIAL ENCOUNTER: Primary | ICD-10-CM

## 2024-06-25 PROCEDURE — 99024 POSTOP FOLLOW-UP VISIT: CPT | Performed by: ORTHOPAEDIC SURGERY

## 2024-06-25 ASSESSMENT — PAIN SCALES - GENERAL: PAINLEVEL_OUTOF10: 6

## 2024-06-25 ASSESSMENT — PAIN - FUNCTIONAL ASSESSMENT: PAIN_FUNCTIONAL_ASSESSMENT: 0-10

## 2024-06-25 ASSESSMENT — PAIN DESCRIPTION - DESCRIPTORS: DESCRIPTORS: ACHING;SORE

## 2024-07-12 NOTE — PROGRESS NOTES
Second postoperative visit after closed duction and percutaneous pinning left fifth metacarpal base fracture.  Date of surgery was June 12.  Patient has been immobilized since surgery.  Pain is well-controlled.  No new complaints.    Examination reveals pin sites that are clean and dry.  No signs of infection.  A bit apprehensive to attempt active finger flexion of the ring and small finger.    Review of x-rays left hand taken today demonstrate postsurgical changes with retained K wires.  Fracture appears to be healing with near anatomic alignment.    Impression: Left fifth metacarpal base fracture.    Plan: Today the K wires were removed.  He was transitioned into a removable ulnar gutter splint and provided instructions for wound care and progressive active and active assist range of motion efforts for his left hand.  He will return in 5 weeks for repeat clinical examination with x-rays left hand out of splint.    Patient was prescribed a contender ulnar gutter orthosis for fifth metacarpal fracture. The patient has weakness, instability and/or deformity of their left hand which requires stabilization from this orthosis to improve their function.      Verbal and written instructions for the use, wear schedule, cleaning and application of this item were given.  Patient was instructed that should the brace result in increased pain, decreased sensation, increased swelling, or an overall worsening of their medical condition, to please contact our office immediately.     Orthotic management and training was provided for skin care, modifications due to healing tissues, edema changes, interruption in skin integrity, and safety precautions with the orthosis.    Emil Hamilton MD    [facility] [employer]  Department of Orthopaedic Surgery  Chief of Hand and Upper Extremity Surgery  [facility]    Dictation performed with the use of voice  recognition software. Syntax and grammatical errors may exist.

## 2024-07-15 DIAGNOSIS — S62.317A CLOSED DISPLACED FRACTURE OF BASE OF FIFTH METACARPAL BONE OF LEFT HAND, INITIAL ENCOUNTER: Primary | ICD-10-CM

## 2024-07-16 ENCOUNTER — HOSPITAL ENCOUNTER (OUTPATIENT)
Dept: RADIOLOGY | Facility: HOSPITAL | Age: 24
Discharge: HOME | End: 2024-07-16
Payer: COMMERCIAL

## 2024-07-16 ENCOUNTER — OFFICE VISIT (OUTPATIENT)
Dept: ORTHOPEDIC SURGERY | Facility: HOSPITAL | Age: 24
End: 2024-07-16
Payer: COMMERCIAL

## 2024-07-16 VITALS — WEIGHT: 126 LBS | HEIGHT: 65 IN | BODY MASS INDEX: 20.99 KG/M2

## 2024-07-16 DIAGNOSIS — S62.317A CLOSED DISPLACED FRACTURE OF BASE OF FIFTH METACARPAL BONE OF LEFT HAND, INITIAL ENCOUNTER: ICD-10-CM

## 2024-07-16 DIAGNOSIS — S62.317D CLOSED DISPLACED FRACTURE OF BASE OF FIFTH METACARPAL BONE OF LEFT HAND WITH ROUTINE HEALING, SUBSEQUENT ENCOUNTER: Primary | ICD-10-CM

## 2024-07-16 PROCEDURE — 99211 OFF/OP EST MAY X REQ PHY/QHP: CPT | Performed by: ORTHOPAEDIC SURGERY

## 2024-07-16 PROCEDURE — L3809 WHFO W/O JOINTS PRE OTS: HCPCS | Performed by: ORTHOPAEDIC SURGERY

## 2024-07-16 PROCEDURE — 73130 X-RAY EXAM OF HAND: CPT | Mod: LT

## 2024-07-16 PROCEDURE — 73130 X-RAY EXAM OF HAND: CPT | Mod: LEFT SIDE | Performed by: RADIOLOGY

## 2024-07-16 ASSESSMENT — PAIN - FUNCTIONAL ASSESSMENT: PAIN_FUNCTIONAL_ASSESSMENT: 0-10

## 2024-07-16 ASSESSMENT — PAIN SCALES - GENERAL: PAINLEVEL_OUTOF10: 7

## 2024-07-16 ASSESSMENT — PAIN DESCRIPTION - DESCRIPTORS: DESCRIPTORS: ACHING;SORE;SHARP

## 2024-08-12 DIAGNOSIS — S62.317D CLOSED DISPLACED FRACTURE OF BASE OF FIFTH METACARPAL BONE OF LEFT HAND WITH ROUTINE HEALING, SUBSEQUENT ENCOUNTER: Primary | ICD-10-CM

## 2024-08-13 ENCOUNTER — APPOINTMENT (OUTPATIENT)
Dept: RADIOLOGY | Facility: HOSPITAL | Age: 24
End: 2024-08-13
Payer: COMMERCIAL

## (undated) DEVICE — GOWN, SURGICAL, SIRUS, NON REINFORCED, LARGE

## (undated) DEVICE — SOLUTION, CHLORHEXIDINE, 4%, 4OZ

## (undated) DEVICE — DRESSING, NON-ADHERENT, 3 X 3 IN, STERILE

## (undated) DEVICE — SUTURE, PROLENE 4-0, 18IN, PS2, BLUE MONO

## (undated) DEVICE — Device

## (undated) DEVICE — GLOVE, SURGICAL, PROTEXIS PI , 7.5, PF, LF

## (undated) DEVICE — NEEDLE, HYPODERMIC, MONOJECT, 25 G X 1.5 IN, LUER LOCK HUB, RED

## (undated) DEVICE — BLANKET, LOWER BODY, VHA PLUS, ADULT

## (undated) DEVICE — CUFF, TOURNIQUET, 18 X 4, DUAL PORT/SNGL BLADDER, DISP, LF

## (undated) DEVICE — SOLUTION, IRRIGATION, X RX SODIUM CHL 0.9%, 1000ML BTL

## (undated) DEVICE — KWIRE, BUSA, .054 X 4IN, NO 2, STERILE

## (undated) DEVICE — 22CM X 45CM, LARGE DELUXE ARM SLING W/PAD

## (undated) DEVICE — SYRINGE, 10 CC, LUER LOCK

## (undated) DEVICE — PADDING, UNDERCAST, WEBRIL, 3 IN X 4 YD, REG, NS

## (undated) DEVICE — BANDAGE, COMPRESSION, KNITTED, ELASTIC, SNGL VELCRO, 3IN, WHITE

## (undated) DEVICE — TRAY, DRY PREP, PREMIUM